# Patient Record
Sex: FEMALE
[De-identification: names, ages, dates, MRNs, and addresses within clinical notes are randomized per-mention and may not be internally consistent; named-entity substitution may affect disease eponyms.]

---

## 2019-06-25 LAB
DEPRECATED APTT PLAS QN: 29.1 SECONDS (ref 23.8–35.5)
DEPRECATED INR PLAS: 0.82
PROTHROMBIN TIME: 11.8 SECONDS (ref 11.9–14.5)

## 2019-06-27 ENCOUNTER — HOSPITAL ENCOUNTER (OUTPATIENT)
Dept: HOSPITAL 88 - OR | Age: 49
Discharge: HOME | End: 2019-06-27
Attending: NEUROLOGICAL SURGERY
Payer: COMMERCIAL

## 2019-06-27 VITALS — DIASTOLIC BLOOD PRESSURE: 76 MMHG | SYSTOLIC BLOOD PRESSURE: 123 MMHG

## 2019-06-27 DIAGNOSIS — Z01.810: ICD-10-CM

## 2019-06-27 DIAGNOSIS — M81.0: ICD-10-CM

## 2019-06-27 DIAGNOSIS — G56.01: Primary | ICD-10-CM

## 2019-06-27 DIAGNOSIS — Z01.812: ICD-10-CM

## 2019-06-27 DIAGNOSIS — K21.9: ICD-10-CM

## 2019-06-27 DIAGNOSIS — I10: ICD-10-CM

## 2019-06-27 DIAGNOSIS — J45.909: ICD-10-CM

## 2019-06-27 PROCEDURE — 85730 THROMBOPLASTIN TIME PARTIAL: CPT

## 2019-06-27 PROCEDURE — 36415 COLL VENOUS BLD VENIPUNCTURE: CPT

## 2019-06-27 PROCEDURE — 85610 PROTHROMBIN TIME: CPT

## 2019-06-27 PROCEDURE — 64721 CARPAL TUNNEL SURGERY: CPT

## 2019-06-27 PROCEDURE — 93005 ELECTROCARDIOGRAM TRACING: CPT

## 2019-06-27 RX ADMIN — CEFAZOLIN SODIUM ONE: 1 SOLUTION INTRAVENOUS at 11:04

## 2019-06-27 NOTE — OPERATIVE REPORT
DATE OF PROCEDURE:  06/27/2019

 

SURGEON:  Dar Patel MD

 

PREOPERATIVE DIAGNOSIS:  Right carpal tunnel syndrome.

 

POSTOPERATIVE DIAGNOSIS:  Right carpal tunnel syndrome.

 

PROCEDURE:  Right carpal tunnel release.

 

ANESTHESIA:  General.

 

INDICATIONS:  The patient is a 48-year-old woman, who presents with right carpal tunnel

syndrome, was taken to the operating room for right carpal tunnel release. 

 

PROCEDURE IN DETAIL:  After induction of general anesthesia, the patient was placed on

the operating table in supine position with the right arm abducted over a hand table.

The right hand, wrist, and forearm were prepped and draped circumferentially in sterile

fashion.  A tourniquet was inflated over the upper arm.  A small midline incision was

created over the median palmar crease of the hand just distal to the distal flexor

crease of the wrist.  The subcutaneous fat was divided.  The transverse carpal ligament

was incised with a #15C blade until the underlying median nerve came into view.  As the

assistant retracted the skin edges, the transverse carpal ligament was then divided

proximally and distally with scissors until the full length of the median nerve was

exposed and decompressed within the carpal tunnel.  The point of maximum compression of

nerve appeared to be 2 cm distal to the distal flexor crease of the wrist where the

ligament was at its thickest.  More distally, the recurrent motor branch of the nerve

was preserved within this fat pad and decompressed.  The wound was copiously irrigated

with bacitracin solution.  Meticulous hemostasis was secured.  Retraction was removed.

The subcutaneous layer was closed with a 3-0 Vicryl stitch.  The skin was closed with a

3-0 nylon stitch in a horizontal mattress fashion.  The skin was then infiltrated with

lidocaine.  A dressing was applied.  The hand was wrapped.  The patient was awakened,

extubated, taken to postanesthesia care unit in stable condition.  No intraoperative

complications were encountered. 

 

ESTIMATED BLOOD LOSS:  Minimal.

 

 

 

 

______________________________

Dar Patel MD PP/MODL

D:  06/27/2019 12:07:27

T:  06/27/2019 16:12:35

Job #:  [id number]

## 2019-06-27 NOTE — XMS REPORT
Patient Summary Document

                             Created on: 2019



MADHU RIVERA

External Reference #: [id number]

: 1970

Sex: Female



Demographics







                          Address                   [address] [place]  [address]

 

                          Home Phone                [phone]

 

                          Preferred Language        Unknown

 

                          Marital Status            Unknown

 

                          Adventism Affiliation     Unknown

 

                          Race                      Unknown

 

                          Ethnic Group              Unknown





Author







                          Author                    [facility]

 

                          Address                   Unknown

 

                          Phone                     Unavailable







Support







                Name            Relationship    Address         Phone

 

                    TSERING COX    PRS                 [address] [place]  [address]                    [phone]

 

                    CODY SCHWARZ      PRS                 [address] [place]  [address]                    [phone]







Care Team Providers







                    Care Team Member Name    Role                Phone

 

                          Unavailable               Unavailable







Payers







             Payer Name    Policy Type    Policy Number    Effective Date    Expiration Date







Problems

This patient has no known problems.



Allergies, Adverse Reactions, Alerts







          Allergy Name    Allergy Type    Status    Severity    Reaction(s)    Onset Date    Inactive 

Date                      Treating Clinician        Comments

 

        No Known Allergies    DA      Active    U               2014 00:00:00                     







Medications

This patient has no known medications.



Results







           Test Description    Test Time    Test Comments    Text Results    Atomic Results    Result

 Comments

 

                - US TRANSVAGINAL NON OB    2019 10:43:00                      Name: MADHU RIVERA

         [facility]                     : 1970 Age/S: 48  / F      
  [address] [facility]                Unit #: [id number]     Loc:               
[place]  [place]  [address]              Phys: Tien Worrell MD                  
                           Acct: [id number]  Dis Date:               Status: 
REG CLI                                 PHONE #: [phone]     Exam Date: 
2019  1020                     FAX #: [phone]      Reason:           
                                         EXAMS:                                 
             CPT CODE:      [id number] US TRANSVAGINAL NON OB                    
81851                    HISTORY: R19.00.               COMPARISON: CT abdomen 
and pelvis from 2019.               Transabdominal and transvaginal 
(for better endometrial and ovarian       evaluation) pelvic ultrasound color 
and Doppler flow evaluation and       grayscale imaging.               Patient 
is post hysterectomy. Urinary bladder is unremarkable. Right       ovary is seen
and measuring 6.2 x 3.8 x 4.7 cm. Color and Doppler flow       with normal 
spectral waveform. Patient is left oophorectomy. No free       fluid.           
     IMPRESSION:                   Patient is post hysterectomy and left 
oophorectomy. The right ovary is         unremarkable with color Doppler flow. 
No free fluid.          ** Electronically Signed by DORI Cabral on 
2019 at 1043 **                      Reported and signed by: Mauro Cabral M.D.                       CC: Hernandez Carrillo MD; Tien Worrell              
                                                                                
     Technologist: AMINAH MUÑOZ RT(R),RDMS                           Trnscb
Date/Time: 2019 (3733) VanR.TH4                        Orig Print D/T: S:
2019 (2406)     Probe: [id number]             PAGE  1                      
Signed Report                                    

 

                - US PELVIS COMPLETE    2019 10:43:00                      Name: MADHU RIVERA 

        [facility]                     : 1970 Age/S: 48  / F       
 [address] [facility]                Unit #: [id number]     Loc:               
[place]  [address]              Phys: Tien Worrell MD                  
                           Acct: [id number]  Dis Date:               Status: 
REG CLI                                 PHONE #: [phone]     Exam Date: 
2019  1020                     FAX #: [phone]      Reason: R19.00    
                                         EXAMS:                                 
             CPT CODE:      [id number] US PELVIS COMPLETE                        
[address]                    HISTORY: R19.00.               COMPARISON: CT abdomen 
and pelvis from 2019.               Transabdominal and transvaginal 
(for better endometrial and ovarian       evaluation) pelvic ultrasound color 
and Doppler flow evaluation and       grayscale imaging.               Patient 
is post hysterectomy. Urinary bladder is unremarkable. Right       ovary is seen
and measuring 6.2 x 3.8 x 4.7 cm. Color and Doppler flow       with normal 
spectral waveform. Patient is left oophorectomy. No free       fluid.           
     IMPRESSION:                   Patient is post hysterectomy and left 
oophorectomy. The right ovary is         unremarkable with color Doppler flow. 
No free fluid.          ** Electronically Signed by DORI Cabral on 
2019 at 1043 **                      Reported and signed by: Mauro Cabral M.D.                       CC: Hernandez Carrillo MD; Tien Worrell              
                                                                                
     Technologist: AMINAH MUÑOZ RT(R),OLIVIAMS                           Trnscb
Date/Time: 2019 (3580) [email or web address]                        Orig Print D/T: S:
2019 (1047)     Probe:                       PAGE  1                      
Signed Report                                    

 

                SCR MAMM BILATERAL JOSE DAVID CAD DIGITAL    2019 12:49:26                     - SCR MAMM BILATERAL

 JOSE DAVID CAD DIGITALBILATERAL DIGITAL SCREENING MAMMOGRAM 3D/2D WITH CAD: 
2019CLINICAL: Asymptomatic.  Digital breast tomosynthesis was performed in 
addition to routine CC and MLO views.  Current mammographic images were 
evaluated by either a [employer] M-Vu or a [employer] ImageChecker CAD (computer aided 
detection system).  Comparison is made to exams dated  2017 mammogram and 
3/24/2016 mammogram - [facility].  There are scattered 
fibroglandular tissues in both breasts.  No suspicious mass, architectural 
distortion, malignant type calcification, or lymph node abnormality detected.  
Breast architecture is stable compared to prior exams.IMPRESSION: NEGATIVEThere 
is no mammographic evidence of malignancy. Resume annual screening mammography 
in one year.  Rashaad Rachel M.D.          ss/penrad:2019 12:49:26  
Imaging Technologist: Steffany ROSS, [facility] sent: 
BIRADS 1-2 Normal  Mammogram BI-RADS: 1 Negative     

 

                - CT ABD PELVIS W/CONT    2019 11:33:00                      Name: MADHU RIVERA

         [facility]     : 1970 Age/S: 48  / F      
  [address] [facility]           Unit #: [id number]     Loc:               
[facility] [place] [address]                Phys: Ashvin Urbina MD                      
                           Acct: [id number]  Dis Date:               Status: 
REG ER                                  PHONE #: [phone]     Exam Date: 
2019  1122                     FAX #: [phone]      Reason: RLQ abd 
pain x 2 days                               EXAMS:                              
                CPT CODE:      [id number] CT ABD PELVIS W/CONT                   
   89968                    HISTORY: Right lower quadrant pain for 2 days.      
        COMPARISON: None available.               CT of abdomen and pelvis with 
IV contrast: 100 mL of Isovue-370.        Automated exposure control.           
   CT of abdomen:               The lung bases are clear.               The 
hepatic parenchyma is enhancing homogeneously.  No discrete mass.        Patient
is post cholecystectomy.  The liver measured 17 cm in length.               
Unremarkable spleen.  The stomach distends incompletely however it is       
normal.  Pancreas is enhancing homogeneously.  Unremarkable adrenals.           
   Kidneys are free from hydroureteronephrosis.  Homogeneous enhancement.       
Bilateral excretion.               No pathologic adenopathy.  Well-opacified 
abdominal and pelvic       vasculature.               No bowel obstruction or 
colitis or diverticulitis or enteritis.               CT PELVIS:               
Poorly visualized appendix is normal.  No inflammatory change.  Pelvic       
bowel loops are unobstructed.               High riding right ovary with cyst 
measuring approximately 4 cm with       average Hounsfield unit measurement 
ranging up to 43.  Left ovary is       not seen.  Patient is post hysterectomy. 
Unremarkable urinary       bladder.  No free fluid or free air or abscess.  No 
pelvic pathologic       adenopathy is noted.               Subcutaneous tissues 
and the musculature are normal in appearance.  No       lytic or blastic lesions
noted within the bony skeleton.  DJD.                 IMPRESSION:               
   Poorly visualized normal appendix.  No bowel obstruction or colitis or       
 diverticulitis or enteritis.                   4 cm right ovarian cyst.  The 
right ovary is high riding.  Left ovary     PAGE  1                       Signed
Report                    (CONTINUED)   Name: MADHU RIVERA         
[facility]     : 1970 Age/S: 48  / F         [address] 
[facility]           Unit #: [id number]     Loc:               [place] [address]                Phys: Ashvin Urbina MD                                
                 Acct: [id number]  Dis Date:               Status: REG ER     
                            PHONE #: [phone]     Exam Date: 2019  
1122                     FAX #: [phone]      Reason: RLQ abd pain x 2 days 
                             EXAMS:                                             
 CPT CODE:      [id number] CT ABD PELVIS W/CONT                       [address]      
        <Continued>          is not seen.  Patient is post hysterectomy.  No 
free fluid or free air         or abscess.          ** Electronically Signed by 
DORI Cabral on 2019 at 1133 **                      Reported and 
signed by: Mauro Cabral M.D.                                    CC: Hernandez Carrillo MD; Ashvin Urbina MD                                                       
                                           Technologist:Gina Keith             
       CTDI:        DLP:        Trnscb Date/Time: 2019 (1133) t.[email or web address]   
                    Orig Print D/T: S: 2019 (1136)       CTDI:          
DLP:          PAGE  2                       Signed Report                       
                                         

 

                COMPREHENSIVE METABOLIC PANEL    2019 11:22:00                      

 

   

 

                SODIUM (test code=NA)    143 mmol/L      135-148          

 

                POTASSIUM (test code=K)    4.3 mmol/L      3.5-5.1          

 

                CHLORIDE (test code=CL)    107 mmol/L      101-109          

 

                CARBON DIOXIDE (test code=CO2)    31.3 mmol/L     21-32            

 

                ANION GAP (test code=GAP)    9 mmol/L        10-20            

 

                GLUCOSE (test code=GLU)    108 mg/dL                  

 

                BLOOD UREA NITROGEN (test code=BUN)    13 mg/dL        3-21             

 

                CREATININE (test code=CREAT)    0.82 mg/dL      0.55-1.3         

 

                BUN/CREATININE RATIO (test code=BUN/CREA)    15.9            10-20            

 

                TOTAL PROTEIN (test code=PROT)    7.4 g/dL        6.5-8.4          

 

                ALBUMIN (test code=ALB)    3.5 g/dL        3.4-4.8          

 

                GLOBULIN (test code=GLOB)    3.9 G/DL        1-10             

 

                ALBUMIN/GLOBULIN RATIO (test code=A/G)    0.9 RATIO       0.75-1.50        

 

                CALCIUM (test code=CA)    9.0 mg/dL       8.4-10.2         

 

                BILIRUBIN TOTAL (test code=BILT)    0.50 mg/dL      0.0-1.0          

 

                SGOT/AST (test code=AST)    14 U/L          6-32             

 

                SGPT/ALT (test code=ALT)    38 U/L          12-78           Note: Change in REFERENCE RANGE due to 

new reagent method.

 

                ALKALINE PHOSPHATASE TOTAL (test code=ALKP)    71 U/L                     





CREATINE KINASE (CK)2019 11:22:00* 





                Test Item       Value           Reference Range    Comments

 

                CREATINE KINASE (CK) (test code=CK)    82 U/L                     





[id number] 11:22:00* 





                Test Item       Value           Reference Range    Comments

 

                CKMB (test code=CKMBT)    0.8 ng/mL       0.0-5.0          





[id number] 11:22:00* 





                Test Item       Value           Reference Range    Comments

 

                TROPONIN-I (test code=TROPI)    <0.015 ng/mL    0.00-0.056       





COMPREHENSIVE METABOLIC [id number] 10:55:00* 





                Test Item       Value           Reference Range    Comments

 

                SODIUM (test code=NA)    143 mmol/L      135-148          

 

                POTASSIUM (test code=K)    4.3 mmol/L      3.5-5.1          

 

                CHLORIDE (test code=CL)    107 mmol/L      101-109          

 

                CARBON DIOXIDE (test code=CO2)    31.3 mmol/L     21-32            

 

                ANION GAP (test code=GAP)    9 mmol/L        10-20            

 

                GLUCOSE (test code=GLU)    108 mg/dL                  

 

                BLOOD UREA NITROGEN (test code=BUN)    13 mg/dL        3-21             

 

                CREATININE (test code=CREAT)    0.82 mg/dL      0.55-1.3         

 

                BUN/CREATININE RATIO (test code=BUN/CREA)    15.9            10-20            

 

                TOTAL PROTEIN (test code=PROT)     gram/dL        6.4-8.2          

 

                ALBUMIN (test code=ALB)     g/dL           3.4-5.0          

 

                GLOBULIN (test code=GLOB)     g/dL           2.7-4.2          

 

                ALBUMIN/GLOBULIN RATIO (test code=A/G)                    0.75-1.50        

 

                CALCIUM (test code=CA)    9.0 mg/dL       8.4-10.2         

 

                BILIRUBIN TOTAL (test code=BILT)     mg/dL          0.2-1.2          

 

                SGOT/AST (test code=AST)     IUnit/L        15-37            

 

                SGPT/ALT (test code=ALT)     U/L            10-69            

 

                ALKALINE PHOSPHATASE TOTAL (test code=ALKP)     IUnit/L                   





CREATINE KINASE (CK)2019 10:55:00* 





                Test Item       Value           Reference Range    Comments

 

                CREATINE KINASE (CK) (test code=CK)     IUnit/L                   





[id number] 10:55:00* 





                Test Item       Value           Reference Range    Comments

 

                CKMB (test code=CKMBT)     ng/mL          0-6.0            





[id number]--92-28 10:55:00* 





                Test Item       Value           Reference Range    Comments

 

                TROPONIN-I (test code=TROPI)     ng/mL          0-0.045          





CBC W/AUTO [id number] 10:43:00* 





                Test Item       Value           Reference Range    Comments

 

                WHITE BLOOD CELL (test code=WBC)    6.6 K/mm3       4.5-12.5         

 

                RED BLOOD CELL (test code=RBC)    4.68 mill/mm3    3.7-5.2          

 

                HEMOGLOBIN (test code=HGB)    14.2 gram/dL    11.5-15.5        

 

                HEMATOCRIT (test code=HCT)    42.9 %          36.0-46.0        

 

                MEAN CELL VOLUME (test code=MCV)    91.7 fL         80-98            

 

                MEAN CELL HGB (test code=MCH)    30.3 picogram    27.0-33.0        

 

                MEAN CELL HGB CONCETRATION (test code=MCHC)    33.1 gram/dL    33.0-36.0        

 

                RED CELL DISTRIBUTION WIDTH (test code=RDW)    12.4 %          11.6-16.2        

 

                RED CELL DISTRIBUTION WIDTH SD (test code=RDW-SD)    40.6 fL         39.1-52.0        

 

                PLATELET COUNT (test code=PLT)    257 K/mm3       150-450          

 

                MEAN PLATELET VOLUME (test code=MPV)    9.7 fL          6.7-11.0         

 

                NEUTROPHIL % (test code=NT%)    49.1 %          39.0-69.0        

 

                LYMPHOCYTE % (test code=LY%)    41.5 %          25.0-55.0        

 

                MONOCYTE % (test code=MO%)    7.7 %           0.0-10.0         

 

                EOSINOPHIL % (test code=EO%)    1.4 %           0.0-5.0          

 

                BASOPHIL % (test code=BA%)    0.3 %           0.0-1.0          

 

                NEUTROPHIL # (test code=NT#)    3.24 K/mm3      1.8-7.7          

 

                LYMPHOCYTE # (test code=LY#)    2.74 K/mm3      1.0-5.0          

 

                MONOCYTE # (test code=MO#)    0.51 K/mm3      0-0.8            

 

                EOSINOPHIL # (test code=EO#)    0.09 K/mm3      0.0-0.5          

 

                BASOPHIL # (test code=BA#)    0.02 K/mm3      0.0-0.2          





- XR CHEST 1 -29-10 10:37:00  Name: MADHU RIVERA          
[facility]    : 1970 Age/S:48  /F            [address] 
[facility]           Unit#:[id number]     Loc: [place] [address]               Phys: Ashvin Ubrina MD                                 
               Acct#: [id number] Dis Date:                   PHONE #: 
[phone]      Status: REG ER                                   FAX #: 
[phone]      Exam Date: 2019           Reason: chest pain            
                             EXAMS:                                             
 CPT CODE:      [id number] XR CHEST 1 V                               09574      
             HISTORY: Chest pain and chest pressure.               COMPARISON: 
None available.               No acute infiltrates, effusion or congestion is 
noted.               Cardiomegaly.                  IMPRESSION:                 
  No acute infiltrates, effusion or congestion.          ** Electronically 
Signed by DORI Cabral on 2019 at 1037 **                      
Reported and signed by: Mauro Cabral M.D.                             CC: 
Ashvin Urbina MD                                                              
                                                       Technologist: Gina Keith
                                           Trnscrpt Data: 2019 (1037) 
t.[email or web address]                          Orig Print D/T: S: 2019 (1040)         
               PAGE  1                       Signed Report